# Patient Record
Sex: FEMALE | Race: WHITE | Employment: OTHER | URBAN - METROPOLITAN AREA
[De-identification: names, ages, dates, MRNs, and addresses within clinical notes are randomized per-mention and may not be internally consistent; named-entity substitution may affect disease eponyms.]

---

## 2018-01-30 ENCOUNTER — OFFICE VISIT (OUTPATIENT)
Dept: ORTHOPEDIC SURGERY | Age: 83
End: 2018-01-30

## 2018-01-30 VITALS — WEIGHT: 149.91 LBS | HEIGHT: 65 IN | BODY MASS INDEX: 24.98 KG/M2

## 2018-01-30 DIAGNOSIS — S42.291A OTHER CLOSED DISPLACED FRACTURE OF PROXIMAL END OF RIGHT HUMERUS, INITIAL ENCOUNTER: Primary | ICD-10-CM

## 2018-01-30 PROCEDURE — 1090F PRES/ABSN URINE INCON ASSESS: CPT | Performed by: PHYSICIAN ASSISTANT

## 2018-01-30 PROCEDURE — G8484 FLU IMMUNIZE NO ADMIN: HCPCS | Performed by: PHYSICIAN ASSISTANT

## 2018-01-30 PROCEDURE — G8427 DOCREV CUR MEDS BY ELIG CLIN: HCPCS | Performed by: PHYSICIAN ASSISTANT

## 2018-01-30 PROCEDURE — 1036F TOBACCO NON-USER: CPT | Performed by: PHYSICIAN ASSISTANT

## 2018-01-30 PROCEDURE — 99203 OFFICE O/P NEW LOW 30 MIN: CPT | Performed by: PHYSICIAN ASSISTANT

## 2018-01-30 PROCEDURE — 1123F ACP DISCUSS/DSCN MKR DOCD: CPT | Performed by: PHYSICIAN ASSISTANT

## 2018-01-30 PROCEDURE — 4040F PNEUMOC VAC/ADMIN/RCVD: CPT | Performed by: PHYSICIAN ASSISTANT

## 2018-01-30 PROCEDURE — G8420 CALC BMI NORM PARAMETERS: HCPCS | Performed by: PHYSICIAN ASSISTANT

## 2018-01-30 NOTE — PROGRESS NOTES
(FOSAMAX) 70 MG tablet, Take 70 mg by mouth every 7 days. , Disp: , Rfl:     lidocaine (LIDODERM) 5 %, Place 1 patch onto the skin every 12 hours. TO BACK DAILY. ON 12HRS OFF 12HRS.  , Disp: , Rfl:     atenolol (TENORMIN) 25 MG tablet, Take 25 mg by mouth daily. , Disp: , Rfl:     docusate sodium (COLACE) 100 MG capsule, Take 100 mg by mouth daily. , Disp: , Rfl:     mometasone (NASONEX) 50 MCG/ACT nasal spray, 1 spray by Nasal route daily. EACH NOSTRIL , Disp: , Rfl:     clopidogrel (PLAVIX) 75 MG tablet, Take 75 mg by mouth daily. , Disp: , Rfl:     Multiple Vitamins-Minerals (THEREMS M) TABS, Take 1 tablet by mouth daily. THEREMS-M 27-0.4MG TABLET  , Disp: , Rfl:     omeprazole (PRILOSEC) 20 MG capsule, Take 20 mg by mouth 2 times daily. , Disp: , Rfl:     Calcium-Vitamin D (CALTRATE 600 PLUS-VIT D PO), Take 1 tablet by mouth 2 times daily. , Disp: , Rfl:     lisinopril (PRINIVIL;ZESTRIL) 20 MG tablet, Take 20 mg by mouth 2 times daily. , Disp: , Rfl:     oxybutynin (DITROPAN) 5 MG tablet, Take 5 mg by mouth 2 times daily. , Disp: , Rfl:     metoclopramide (REGLAN) 5 MG tablet, Take 5 mg by mouth 3 times daily. , Disp: , Rfl:     ipratropium-albuterol (DUONEB) 0.5-2.5 (3) MG/3ML SOLN nebulizer solution, Inhale 1 vial into the lungs 4 times daily. , Disp: , Rfl:     polyvinyl alcohol (TEARGEN) 1.4 % ophthalmic solution, Place 2 drops into both eyes 4 times daily. , Disp: , Rfl:     hydrocodone-acetaminophen (VICODIN) 5-500 MG per tablet, Take 1 tablet by mouth nightly.  , Disp: , Rfl:     lorazepam (ATIVAN) 1 MG tablet, Take 1 mg by mouth nightly.  , Disp: , Rfl:     magnesium hydroxide (MILK OF MAGNESIA) 400 MG/5ML suspension, Take 45 mLs by mouth nightly.  , Disp: , Rfl:     clotrimazole-betamethasone (LOTRISONE) cream, Apply 1 applicator topically 3 times daily. Apply topically 2 times daily.  , Disp: , Rfl:     nystatin (MYCOSTATIN) cream, Apply 1 applicator topically 2 times daily. Apply topically 2 times daily. , Disp: , Rfl:     hydrocortisone (ANUSOL-HC) 2.5 % rectal cream, Place 1 each rectally 4 times daily. Place rectally 2 times daily. , Disp: , Rfl:   Allergies:  Sulfa antibiotics  Social History:    reports that she has never smoked. She has never used smokeless tobacco. She reports that she does not drink alcohol or use drugs. Family History:   History reviewed. No pertinent family history. REVIEW OF SYSTEMS:   For new problems, a full review of systems will be found scanned in the patient's chart. CONSTITUTIONAL: Denies unexplained weight loss, fevers, chills   NEUROLOGICAL: Positive for unsteady gait  SKIN: Denies skin changes, delayed healing, rash, itching       PHYSICAL EXAM:    Vitals: Height 5' 5\" (1.651 m), weight 149 lb 14.6 oz (68 kg). GENERAL EXAM:  · General Apparence: Patient is adequately groomed with no evidence of malnutrition. · Orientation: The patient is oriented to person  · Mood & Affect:The patient's mood and affect are appropriate       Right shoulder PHYSICAL EXAMINATION:  · Inspection:  Several skin tears of the bilateral hands and 1 involving the proximal aspect of the shoulder. No active drainage, these are healing nicely without any evidence of infection. There is also ecchymosis of the proximal humerus as expected    · Palpation:  She is tender through the proximal humerus, no significant tenderness of the elbow wrist or forearm      · Range of Motion: Able to dorsiflex the wrist today, range of motion of the hand is tolerated    · Strength: Decreased  strength secondary to pain    · Special Tests:  Radial pulse is palpable and 2+            · Skin:  There are no rashes, ulcerations or lesions. · Gait & station: The patient presents on a stretcher today      · Additional Examinations:        Left Upper Extremity: Examination of the left upper extremity does not show any tenderness, deformity or injury.   Range of motion is

## 2018-02-20 ENCOUNTER — OFFICE VISIT (OUTPATIENT)
Dept: ORTHOPEDIC SURGERY | Age: 83
End: 2018-02-20

## 2018-02-20 VITALS — BODY MASS INDEX: 24.98 KG/M2 | WEIGHT: 149.91 LBS | HEIGHT: 65 IN

## 2018-02-20 DIAGNOSIS — M89.8X2 PAIN OF RIGHT HUMERUS: Primary | ICD-10-CM

## 2018-02-20 DIAGNOSIS — S42.291A OTHER CLOSED DISPLACED FRACTURE OF PROXIMAL END OF RIGHT HUMERUS, INITIAL ENCOUNTER: ICD-10-CM

## 2018-02-20 PROCEDURE — 1123F ACP DISCUSS/DSCN MKR DOCD: CPT | Performed by: PHYSICIAN ASSISTANT

## 2018-02-20 PROCEDURE — 4040F PNEUMOC VAC/ADMIN/RCVD: CPT | Performed by: PHYSICIAN ASSISTANT

## 2018-02-20 PROCEDURE — 99213 OFFICE O/P EST LOW 20 MIN: CPT | Performed by: PHYSICIAN ASSISTANT

## 2018-02-20 PROCEDURE — G8484 FLU IMMUNIZE NO ADMIN: HCPCS | Performed by: PHYSICIAN ASSISTANT

## 2018-02-20 PROCEDURE — G8427 DOCREV CUR MEDS BY ELIG CLIN: HCPCS | Performed by: PHYSICIAN ASSISTANT

## 2018-02-20 PROCEDURE — 1036F TOBACCO NON-USER: CPT | Performed by: PHYSICIAN ASSISTANT

## 2018-02-20 PROCEDURE — 1090F PRES/ABSN URINE INCON ASSESS: CPT | Performed by: PHYSICIAN ASSISTANT

## 2018-02-20 PROCEDURE — G8420 CALC BMI NORM PARAMETERS: HCPCS | Performed by: PHYSICIAN ASSISTANT

## 2018-02-20 NOTE — PROGRESS NOTES
CHIEF COMPLAINT:    Chief Complaint   Patient presents with    Shoulder Pain     ck right proximal humerus fx       HISTORY OF PRESENT ILLNESS:                The patient is a 80 y.o. female she presents today for follow-up regarding her RIGHT proximal humerus fracture date of injury 1/27/2018. [facility] is [facility] at [facility]. Before this injury, she was able to ambulate with a walker. The patient and her family were not interested in any type of surgical intervention and conservative treatment was discussed. The patient presents today for repeat x-ray and evaluation.   Past Medical History:   Diagnosis Date    Blindness, legal     L EYE    Constipation     Cystocele     Diverticulosis     DJD (degenerative joint disease)     SEVERE    [facility]     Eye problem     R EYE 20/10    GERD (gastroesophageal reflux disease)     [place] (hard of hearing)     Hypertension     Insomnia     Osteoarthritis     R FOOT/ANKLE DEFORMITY    PVD (peripheral vascular disease) ([address]     Stenosis, spinal, lumbar     Unspecified cerebral artery occlusion with cerebral infarction           The pain assessment was noted & is as follows:  Pain Assessment  Location of Pain: Other (Comment) (humerus)  Location Modifiers: Right  Quality of Pain: Aching, Dull  Aggravating Factors: Bending, Stretching  Limiting Behavior: Some  Relieving Factors: Rest]      Work Status/Functionality:     Past Medical History: Medical history form was reviewed today & can be found in the media tab  Past Medical History:   Diagnosis Date    Blindness, legal     L EYE    Constipation     Cystocele     Diverticulosis     DJD (degenerative joint disease)     SEVERE    DVT (deep venous thrombosis) ([address]     Eye problem     R EYE 20/10    GERD (gastroesophageal reflux disease)     [place] (hard of hearing)     Hypertension     Insomnia     Osteoarthritis     R FOOT/ANKLE DEFORMITY    [facility]     Stenosis, spinal, lumbar     Unspecified cerebral artery occlusion with cerebral infarction       Past Surgical History:     Past Surgical History:   Procedure Laterality Date    BACK SURGERY      CHOLECYSTECTOMY      EYE SURGERY      CATARACTS    HYSTERECTOMY      KNEE SURGERY      L KNEE REPLACEMENT    RECTOCELE REPAIR      SPINAL CORD DECOMPRESSION      LUMBAR     Current Medications:     Current Outpatient Prescriptions:     acetaminophen (TYLENOL) 325 MG tablet, Take 650 mg by mouth.  , Disp: , Rfl:     phenol 1.4 % LIQD, Take 1 drop by mouth every 2 hours as needed. SPRAY AFFECTED AREA, LEAVE IN MOUTH FOR 15 SECONDS THEN SPIT. , Disp: , Rfl:     guaifenesin (ROBITUSSIN) 100 MG/5ML SOLN, Take 5 mLs by mouth every 4 hours as needed. , Disp: , Rfl:     aluminum & magnesium hydroxide-simethicone (MAALOX) 558-249-47 MG/5ML SUSP suspension, Take 30 mLs by mouth as needed. , Disp: , Rfl:     simethicone (MYLICON) 80 MG chewable tablet, Take 80 mg by mouth 4 times daily as needed. , Disp: , Rfl:     alendronate (FOSAMAX) 70 MG tablet, Take 70 mg by mouth every 7 days. , Disp: , Rfl:     lidocaine (LIDODERM) 5 %, Place 1 patch onto the skin every 12 hours. TO BACK DAILY. ON 12HRS OFF 12HRS.  , Disp: , Rfl:     atenolol (TENORMIN) 25 MG tablet, Take 25 mg by mouth daily. , Disp: , Rfl:     docusate sodium (COLACE) 100 MG capsule, Take 100 mg by mouth daily. , Disp: , Rfl:     mometasone (NASONEX) 50 MCG/ACT nasal spray, 1 spray by Nasal route daily. EACH NOSTRIL , Disp: , Rfl:     clopidogrel (PLAVIX) 75 MG tablet, Take 75 mg by mouth daily. , Disp: , Rfl:     Multiple Vitamins-Minerals (THEREMS M) TABS, Take 1 tablet by mouth daily. THEREMS-M 27-0.4MG TABLET  , Disp: , Rfl:     omeprazole (PRILOSEC) 20 MG capsule, Take 20 mg by mouth 2 times daily. , Disp: , Rfl:     Calcium-Vitamin D (CALTRATE 600 PLUS-VIT D PO), Take 1 tablet by mouth 2 times daily.   , Disp: , Rfl:     lisinopril (PRINIVIL;ZESTRIL) 20 MG tablet, Take 20 mg by mouth 2 times daily. , Disp: , Rfl:     oxybutynin (DITROPAN) 5 MG tablet, Take 5 mg by mouth 2 times daily. , Disp: , Rfl:     metoclopramide (REGLAN) 5 MG tablet, Take 5 mg by mouth 3 times daily. , Disp: , Rfl:     ipratropium-albuterol (DUONEB) 0.5-2.5 (3) MG/3ML SOLN nebulizer solution, Inhale 1 vial into the lungs 4 times daily. , Disp: , Rfl:     polyvinyl alcohol (TEARGEN) 1.4 % ophthalmic solution, Place 2 drops into both eyes 4 times daily. , Disp: , Rfl:     hydrocodone-acetaminophen (VICODIN) 5-500 MG per tablet, Take 1 tablet by mouth nightly.  , Disp: , Rfl:     lorazepam (ATIVAN) 1 MG tablet, Take 1 mg by mouth nightly.  , Disp: , Rfl:     magnesium hydroxide (MILK OF MAGNESIA) 400 MG/5ML suspension, Take 45 mLs by mouth nightly.  , Disp: , Rfl:     clotrimazole-betamethasone (LOTRISONE) cream, Apply 1 applicator topically 3 times daily. Apply topically 2 times daily. , Disp: , Rfl:     nystatin (MYCOSTATIN) cream, Apply 1 applicator topically 2 times daily. Apply topically 2 times daily. , Disp: , Rfl:     hydrocortisone (ANUSOL-HC) 2.5 % rectal cream, Place 1 each rectally 4 times daily. Place rectally 2 times daily. , Disp: , Rfl:   Allergies:  Sulfa antibiotics  Social History:    reports that she has never smoked. She has never used smokeless tobacco. She reports that she does not drink alcohol or use drugs. Family History:   History reviewed. No pertinent family history. REVIEW OF SYSTEMS:   For new problems, a full review of systems will be found scanned in the patient's chart. CONSTITUTIONAL: Denies unexplained weight loss, fevers, chills     SKIN: Denies skin changes, delayed healing, rash, itching       PHYSICAL EXAM:    Vitals: Height 5' 5\" (1.651 m), weight 149 lb 14.6 oz (68 kg). GENERAL EXAM:  · General Apparence: Patient is adequately groomed with no evidence of malnutrition. · Orientation:  The

## 2018-03-21 ENCOUNTER — OFFICE VISIT (OUTPATIENT)
Dept: ORTHOPEDIC SURGERY | Age: 83
End: 2018-03-21

## 2018-03-21 VITALS — WEIGHT: 149.91 LBS | HEIGHT: 65 IN | BODY MASS INDEX: 24.98 KG/M2

## 2018-03-21 DIAGNOSIS — S42.291A OTHER CLOSED DISPLACED FRACTURE OF PROXIMAL END OF RIGHT HUMERUS, INITIAL ENCOUNTER: ICD-10-CM

## 2018-03-21 DIAGNOSIS — M25.511 PAIN OF RIGHT SHOULDER REGION: Primary | ICD-10-CM

## 2018-03-21 PROCEDURE — 1090F PRES/ABSN URINE INCON ASSESS: CPT | Performed by: ORTHOPAEDIC SURGERY

## 2018-03-21 PROCEDURE — 1123F ACP DISCUSS/DSCN MKR DOCD: CPT | Performed by: ORTHOPAEDIC SURGERY

## 2018-03-21 PROCEDURE — 1036F TOBACCO NON-USER: CPT | Performed by: ORTHOPAEDIC SURGERY

## 2018-03-21 PROCEDURE — 99213 OFFICE O/P EST LOW 20 MIN: CPT | Performed by: ORTHOPAEDIC SURGERY

## 2018-03-21 PROCEDURE — G8420 CALC BMI NORM PARAMETERS: HCPCS | Performed by: ORTHOPAEDIC SURGERY

## 2018-03-21 PROCEDURE — 4040F PNEUMOC VAC/ADMIN/RCVD: CPT | Performed by: ORTHOPAEDIC SURGERY

## 2018-03-21 PROCEDURE — G8484 FLU IMMUNIZE NO ADMIN: HCPCS | Performed by: ORTHOPAEDIC SURGERY

## 2018-03-21 PROCEDURE — G8427 DOCREV CUR MEDS BY ELIG CLIN: HCPCS | Performed by: ORTHOPAEDIC SURGERY

## 2018-03-21 NOTE — PROGRESS NOTES
History:   Procedure Laterality Date    BACK SURGERY      CHOLECYSTECTOMY      EYE SURGERY      CATARACTS    HYSTERECTOMY      KNEE SURGERY      L KNEE REPLACEMENT    RECTOCELE REPAIR      SPINAL CORD DECOMPRESSION      LUMBAR     Current Medications:     Current Outpatient Prescriptions:     acetaminophen (TYLENOL) 325 MG tablet, Take 650 mg by mouth.  , Disp: , Rfl:     phenol 1.4 % LIQD, Take 1 drop by mouth every 2 hours as needed. SPRAY AFFECTED AREA, LEAVE IN MOUTH FOR 15 SECONDS THEN SPIT. , Disp: , Rfl:     guaifenesin (ROBITUSSIN) 100 MG/5ML SOLN, Take 5 mLs by mouth every 4 hours as needed. , Disp: , Rfl:     aluminum & magnesium hydroxide-simethicone (MAALOX) 280-769-51 MG/5ML SUSP suspension, Take 30 mLs by mouth as needed. , Disp: , Rfl:     simethicone (MYLICON) 80 MG chewable tablet, Take 80 mg by mouth 4 times daily as needed. , Disp: , Rfl:     alendronate (FOSAMAX) 70 MG tablet, Take 70 mg by mouth every 7 days. , Disp: , Rfl:     lidocaine (LIDODERM) 5 %, Place 1 patch onto the skin every 12 hours. TO BACK DAILY. ON 12HRS OFF 12HRS.  , Disp: , Rfl:     atenolol (TENORMIN) 25 MG tablet, Take 25 mg by mouth daily. , Disp: , Rfl:     docusate sodium (COLACE) 100 MG capsule, Take 100 mg by mouth daily. , Disp: , Rfl:     mometasone (NASONEX) 50 MCG/ACT nasal spray, 1 spray by Nasal route daily. EACH NOSTRIL , Disp: , Rfl:     clopidogrel (PLAVIX) 75 MG tablet, Take 75 mg by mouth daily. , Disp: , Rfl:     Multiple Vitamins-Minerals (THEREMS M) TABS, Take 1 tablet by mouth daily. THEREMS-M 27-0.4MG TABLET  , Disp: , Rfl:     omeprazole (PRILOSEC) 20 MG capsule, Take 20 mg by mouth 2 times daily. , Disp: , Rfl:     Calcium-Vitamin D (CALTRATE 600 PLUS-VIT D PO), Take 1 tablet by mouth 2 times daily. , Disp: , Rfl:     lisinopril (PRINIVIL;ZESTRIL) 20 MG tablet, Take 20 mg by mouth 2 times daily.   , Disp: , Rfl:     oxybutynin (DITROPAN) 5 MG tablet, Take 5 mg by mouth 2 times daily. , Disp: , Rfl:     metoclopramide (REGLAN) 5 MG tablet, Take 5 mg by mouth 3 times daily. , Disp: , Rfl:     ipratropium-albuterol (DUONEB) 0.5-2.5 (3) MG/3ML SOLN nebulizer solution, Inhale 1 vial into the lungs 4 times daily. , Disp: , Rfl:     polyvinyl alcohol (TEARGEN) 1.4 % ophthalmic solution, Place 2 drops into both eyes 4 times daily. , Disp: , Rfl:     hydrocodone-acetaminophen (VICODIN) 5-500 MG per tablet, Take 1 tablet by mouth nightly.  , Disp: , Rfl:     lorazepam (ATIVAN) 1 MG tablet, Take 1 mg by mouth nightly.  , Disp: , Rfl:     magnesium hydroxide (MILK OF MAGNESIA) 400 MG/5ML suspension, Take 45 mLs by mouth nightly.  , Disp: , Rfl:     clotrimazole-betamethasone (LOTRISONE) cream, Apply 1 applicator topically 3 times daily. Apply topically 2 times daily. , Disp: , Rfl:     nystatin (MYCOSTATIN) cream, Apply 1 applicator topically 2 times daily. Apply topically 2 times daily. , Disp: , Rfl:     hydrocortisone (ANUSOL-HC) 2.5 % rectal cream, Place 1 each rectally 4 times daily. Place rectally 2 times daily. , Disp: , Rfl:   Allergies:  Sulfa antibiotics  Social History:    reports that she has never smoked. She has never used smokeless tobacco. She reports that she does not drink alcohol or use drugs. Family History:   History reviewed. No pertinent family history. REVIEW OF SYSTEMS:   For new problems, a full review of systems will be found scanned in the patient's chart. CONSTITUTIONAL: Denies unexplained weight loss, fevers, chills   NEUROLOGICAL: Denies unsteady gait or progressive weakness  SKIN: Denies skin changes, delayed healing, rash, itching       PHYSICAL EXAM:    Vitals: Height 5' 5\" (1.651 m), weight 149 lb 14.6 oz (68 kg). GENERAL EXAM:  · General Apparence: Patient is adequately groomed with no evidence of malnutrition. · Orientation: The patient is oriented to time, place and person.    · Mood & Affect:The patient's mood and affect are

## 2018-09-04 ENCOUNTER — INITIAL CONSULT (OUTPATIENT)
Dept: SURGERY | Age: 83
End: 2018-09-04

## 2018-09-04 ENCOUNTER — PREP FOR PROCEDURE (OUTPATIENT)
Dept: SURGERY | Age: 83
End: 2018-09-04

## 2018-09-04 ENCOUNTER — TELEPHONE (OUTPATIENT)
Dept: SURGERY | Age: 83
End: 2018-09-04

## 2018-09-04 VITALS
HEIGHT: 65 IN | BODY MASS INDEX: 23.99 KG/M2 | DIASTOLIC BLOOD PRESSURE: 60 MMHG | WEIGHT: 144 LBS | SYSTOLIC BLOOD PRESSURE: 100 MMHG

## 2018-09-04 DIAGNOSIS — D48.5 NEOPLASM OF UNCERTAIN BEHAVIOR OF SKIN: Primary | ICD-10-CM

## 2018-09-04 PROBLEM — M81.0 AGE-RELATED OSTEOPOROSIS WITHOUT CURRENT PATHOLOGICAL FRACTURE: Status: ACTIVE | Noted: 2017-11-27

## 2018-09-04 PROBLEM — M15.9 OSTEOARTHRITIS OF MULTIPLE JOINTS: Status: ACTIVE | Noted: 2017-11-27

## 2018-09-04 PROBLEM — H35.3290 EXUDATIVE AGE-RELATED MACULAR DEGENERATION (HCC): Status: ACTIVE | Noted: 2017-11-27

## 2018-09-04 PROBLEM — I70.219 ATHEROSCLEROSIS OF NATIVE ARTERIES OF EXTREMITIES WITH INTERMITTENT CLAUDICATION, UNSPECIFIED EXTREMITY (HCC): Status: ACTIVE | Noted: 2017-11-27

## 2018-09-04 PROBLEM — H54.50 BLINDNESS OF LEFT EYE WITH LOW VISION IN CONTRALATERAL EYE: Status: ACTIVE | Noted: 2017-11-27

## 2018-09-04 PROBLEM — F41.1 GAD (GENERALIZED ANXIETY DISORDER): Status: ACTIVE | Noted: 2017-12-10

## 2018-09-04 PROBLEM — F33.1 MODERATE EPISODE OF RECURRENT MAJOR DEPRESSIVE DISORDER (HCC): Status: ACTIVE | Noted: 2017-11-27

## 2018-09-04 PROBLEM — H54.40 BLINDNESS OF LEFT EYE WITH LOW VISION IN CONTRALATERAL EYE: Status: ACTIVE | Noted: 2017-11-27

## 2018-09-04 PROBLEM — I10 ESSENTIAL HYPERTENSION: Status: ACTIVE | Noted: 2017-11-27

## 2018-09-04 PROCEDURE — 1101F PT FALLS ASSESS-DOCD LE1/YR: CPT | Performed by: SURGERY

## 2018-09-04 PROCEDURE — G8428 CUR MEDS NOT DOCUMENT: HCPCS | Performed by: SURGERY

## 2018-09-04 PROCEDURE — 1090F PRES/ABSN URINE INCON ASSESS: CPT | Performed by: SURGERY

## 2018-09-04 PROCEDURE — 99202 OFFICE O/P NEW SF 15 MIN: CPT | Performed by: SURGERY

## 2018-09-04 PROCEDURE — G8420 CALC BMI NORM PARAMETERS: HCPCS | Performed by: SURGERY

## 2018-09-04 PROCEDURE — 1036F TOBACCO NON-USER: CPT | Performed by: SURGERY

## 2018-09-04 PROCEDURE — 1123F ACP DISCUSS/DSCN MKR DOCD: CPT | Performed by: SURGERY

## 2018-09-04 PROCEDURE — 4040F PNEUMOC VAC/ADMIN/RCVD: CPT | Performed by: SURGERY

## 2018-09-04 RX ORDER — FUROSEMIDE 40 MG/1
40 TABLET ORAL 2 TIMES DAILY
COMMUNITY

## 2018-09-04 RX ORDER — SODIUM CHLORIDE 0.9 % (FLUSH) 0.9 %
10 SYRINGE (ML) INJECTION EVERY 12 HOURS SCHEDULED
Status: CANCELLED | OUTPATIENT
Start: 2018-09-04 | End: 2019-09-04

## 2018-09-04 RX ORDER — SODIUM CHLORIDE 0.9 % (FLUSH) 0.9 %
10 SYRINGE (ML) INJECTION PRN
Status: CANCELLED | OUTPATIENT
Start: 2018-09-04 | End: 2019-09-04

## 2018-09-04 RX ORDER — FERROUS SULFATE 325(65) MG
325 TABLET ORAL
COMMUNITY

## 2018-09-04 RX ORDER — DULOXETIN HYDROCHLORIDE 30 MG/1
30 CAPSULE, DELAYED RELEASE ORAL DAILY
COMMUNITY

## 2018-09-05 ENCOUNTER — TELEPHONE (OUTPATIENT)
Dept: SURGERY | Age: 83
End: 2018-09-05

## 2018-09-06 RX ORDER — POLYETHYLENE GLYCOL 3350 17 G/17G
17 POWDER, FOR SOLUTION ORAL DAILY
COMMUNITY

## 2018-09-06 RX ORDER — ACETAMINOPHEN 500 MG
1000 TABLET ORAL NIGHTLY
COMMUNITY

## 2018-09-06 RX ORDER — LANOLIN ALCOHOL/MO/W.PET/CERES
6 CREAM (GRAM) TOPICAL NIGHTLY PRN
COMMUNITY

## 2018-09-06 NOTE — PRE-PROCEDURE INSTRUCTIONS
.1.  Do not eat or drink anything after 12 midnight prior to surgery. This includes no water, chewing gum or mints. 2.  Take the following pills with a small sip of water on the morning of surgery 9/13/18. 3.  Aspirin, Ibuprofen, Advil, Naproxen, Vitamin E and other Anti-inflammatory products should be stopped for 5 days before surgery or as directed by your physician. 4.  Check with your doctor regarding stopping Plavix, Coumadin, Lovenox, Fragmin or other blood thinners. 5.  Do not smoke and do not drink alcoholic beverages 24 hours prior to surgery. This includes NA Beer. 6.  You may brush your teeth and gargle the morning of surgery. DO NOT SWALLOW WATER.  7.  You MUST make arrangements for a responsible adult to take you home after your surgery. You will not be allowed to leave alone or drive yourself home. It is strongly suggested someone stay with you the first 24 hours. Your surgery will be cancelled if you do not have a ride home. 8.  A parent/legal guardian must accompany a child scheduled for surgery and plan to stay at the hospital until the child is discharged. Please do not bring other children with you. 9.  Please wear simple, loose fitting clothing to the hospital.  Earlis Alu not bring valuables ( money, credit cards, checkbooks, etc.)  Do not wear any makeup (including no eye makeup) or nail polish on your fingers or toes. 10.  Do not wear any jewelry or piercing on the day of surgery. All body piercing jewelry must be removed. 11.  If you have dentures, they will be removed before going to the OR; we will provide you a container. If you wear contact lenses or glasses, they will be removed; please bring a case for them. 12.  Please see your family doctor/pediatrician for a history & physical and/or concerning medications. Bring any test results/reports from your physician's office the day of surgery.     13.  Remember to bring Blood Bank Bracelet to the hospital on the day of

## 2018-09-12 ENCOUNTER — ANESTHESIA EVENT (OUTPATIENT)
Dept: OPERATING ROOM | Age: 83
End: 2018-09-12
Payer: COMMERCIAL

## 2018-09-12 NOTE — ANESTHESIA PRE PROCEDURE
Department of Anesthesiology  Preprocedure Note       Name:  Beatrix Cushing   Age:  80 y.o.  :  1921                                          MRN:  [id number]         Date:  2018      Surgeon: Virginia Angela):  Belinda Paz MD    Procedure: Procedure(s):  EXCISION LESIONS UPPER ARM    Medications prior to admission:   Prior to Admission medications    Medication Sig Start Date End Date Taking? Authorizing Provider   acetaminophen (TYLENOL) 500 MG tablet Take 1,000 mg by mouth nightly   Yes Historical Provider, MD   melatonin 3 MG TABS tablet Take 6 mg by mouth nightly as needed   Yes Historical Provider, MD   Cholecalciferol (VITAMIN D3) 5000 units TABS Take 10,000 Units by mouth once a week   Yes Historical Provider, MD   sodium chloride (OCEAN, BABY AYR) 0.65 % nasal spray 1 spray by Nasal route as needed for Congestion   Yes Historical Provider, MD   polyethylene glycol (GLYCOLAX) powder Take 17 g by mouth daily   Yes Historical Provider, MD   Lidocaine 4 % GEL Apply topically as needed   Yes Historical Provider, MD   furosemide (LASIX) 40 MG tablet Take 40 mg by mouth 2 times daily    Historical Provider, MD   ferrous sulfate 325 (65 Fe) MG tablet Take 325 mg by mouth daily (with breakfast)    Historical Provider, MD   DULoxetine (CYMBALTA) 30 MG extended release capsule Take 30 mg by mouth daily    Historical Provider, MD   acetaminophen (TYLENOL) 325 MG tablet Take 650 mg by mouth. Historical Provider, MD   phenol 1.4 % LIQD Take 1 drop by mouth every 2 hours as needed. SPRAY AFFECTED AREA, LEAVE IN MOUTH FOR 15 SECONDS THEN SPIT. Historical Provider, MD   atenolol (TENORMIN) 25 MG tablet Take 25 mg by mouth daily. Historical Provider, MD   docusate sodium (COLACE) 100 MG capsule Take 100 mg by mouth daily. Historical Provider, MD   mometasone (NASONEX) 50 MCG/ACT nasal spray 1 spray by Nasal route daily.  EACH NOSTRIL     Historical Provider, MD   clopidogrel (PLAVIX) 75 MG and Nursing notes reviewed no history of anesthetic complications:   Airway: Mallampati: III     Neck ROM: full   Dental:          Pulmonary:Negative Pulmonary ROS and normal exam                               Cardiovascular:Negative CV ROS    (+) hypertension:,                   Neuro/Psych:   Negative Neuro/Psych ROS  (+) CVA:, psychiatric history:            GI/Hepatic/Renal: Neg GI/Hepatic/Renal ROS  (+) GERD: well controlled,      (-) hiatal hernia       Endo/Other: Negative Endo/Other ROS                    Abdominal:           Vascular:                                      Anesthesia Plan      general     ASA 3     (I discussed with the patient the risks and benefits of PIV, general anesthesia, IV Narcotics, PACU. All questions were answered the patient agrees with the plan.)  Induction: intravenous. discussed DNR status with POA family. Limited brief resuscitation options #2 on sheet signed and described all questions answered    Pre-Operative Diagnosis: LESION BILATERAL UPPER ARM    80 y.o.   BMI:  Body mass index is 23.96 kg/m².      Vitals:    09/06/18 1134   Weight: 144 lb (65.3 kg)   Height: 5' 5\" (1.651 m)       Allergies   Allergen Reactions    Sulfa Antibiotics      Does not remember what reaction was       Social History   Substance Use Topics    Smoking status: Never Smoker    Smokeless tobacco: Never Used    Alcohol use No       LABS:    CBC  Lab Results   Component Value Date/Time    WBC 10.3 01/27/2018 05:07 AM    HGB 12.3 01/27/2018 05:07 AM    HCT 36.7 01/27/2018 05:07 AM     01/27/2018 05:07 AM     RENAL  Lab Results   Component Value Date/Time     (L) 01/27/2018 05:07 AM    K 5.1 01/27/2018 05:07 AM    CL 90 (L) 01/27/2018 05:07 AM    CO2 24 01/27/2018 05:07 AM    BUN 19 01/27/2018 05:07 AM    CREATININE 0.9 01/27/2018 05:07 AM    GLUCOSE 116 (H) 01/27/2018 05:07 AM     COAGS  No results found for: PROTIME, INR, APTT    Charisse Gaxiola MD

## 2018-09-13 ENCOUNTER — HOSPITAL ENCOUNTER (OUTPATIENT)
Age: 83
Setting detail: OUTPATIENT SURGERY
Discharge: HOME | End: 2018-09-13
Attending: SURGERY
Payer: COMMERCIAL

## 2018-09-13 ENCOUNTER — ANESTHESIA (OUTPATIENT)
Dept: OPERATING ROOM | Age: 83
End: 2018-09-13
Payer: COMMERCIAL

## 2018-09-13 VITALS
RESPIRATION RATE: 18 BRPM | DIASTOLIC BLOOD PRESSURE: 78 MMHG | OXYGEN SATURATION: 96 % | SYSTOLIC BLOOD PRESSURE: 145 MMHG

## 2018-09-13 VITALS
BODY MASS INDEX: 23.99 KG/M2 | WEIGHT: 144 LBS | RESPIRATION RATE: 16 BRPM | HEART RATE: 62 BPM | TEMPERATURE: 97.8 F | SYSTOLIC BLOOD PRESSURE: 147 MMHG | DIASTOLIC BLOOD PRESSURE: 65 MMHG | HEIGHT: 65 IN | OXYGEN SATURATION: 100 %

## 2018-09-13 DIAGNOSIS — D48.5 NEOPLASM OF UNCERTAIN BEHAVIOR OF SKIN: Primary | ICD-10-CM

## 2018-09-13 PROCEDURE — 88305 TISSUE EXAM BY PATHOLOGIST: CPT

## 2018-09-13 PROCEDURE — 2580000003 HC RX 258: Performed by: SURGERY

## 2018-09-13 PROCEDURE — 3600000002 HC SURGERY LEVEL 2 BASE: Performed by: SURGERY

## 2018-09-13 PROCEDURE — 7100000011 HC PHASE II RECOVERY - ADDTL 15 MIN: Performed by: SURGERY

## 2018-09-13 PROCEDURE — 6370000000 HC RX 637 (ALT 250 FOR IP): Performed by: ANESTHESIOLOGY

## 2018-09-13 PROCEDURE — 2500000003 HC RX 250 WO HCPCS: Performed by: ANESTHESIOLOGY

## 2018-09-13 PROCEDURE — 3700000000 HC ANESTHESIA ATTENDED CARE: Performed by: SURGERY

## 2018-09-13 PROCEDURE — 2580000003 HC RX 258: Performed by: ANESTHESIOLOGY

## 2018-09-13 PROCEDURE — 7100000010 HC PHASE II RECOVERY - FIRST 15 MIN: Performed by: SURGERY

## 2018-09-13 PROCEDURE — 6360000002 HC RX W HCPCS: Performed by: NURSE ANESTHETIST, CERTIFIED REGISTERED

## 2018-09-13 PROCEDURE — 3700000001 HC ADD 15 MINUTES (ANESTHESIA): Performed by: SURGERY

## 2018-09-13 PROCEDURE — 2500000003 HC RX 250 WO HCPCS: Performed by: NURSE ANESTHETIST, CERTIFIED REGISTERED

## 2018-09-13 PROCEDURE — 12035 INTMD RPR S/A/T/EXT 12.6-20: CPT | Performed by: SURGERY

## 2018-09-13 PROCEDURE — 2500000003 HC RX 250 WO HCPCS: Performed by: SURGERY

## 2018-09-13 PROCEDURE — 6360000002 HC RX W HCPCS: Performed by: SURGERY

## 2018-09-13 PROCEDURE — 11606 EXC TR-EXT MAL+MARG >4 CM: CPT | Performed by: SURGERY

## 2018-09-13 PROCEDURE — 3600000012 HC SURGERY LEVEL 2 ADDTL 15MIN: Performed by: SURGERY

## 2018-09-13 PROCEDURE — 2709999900 HC NON-CHARGEABLE SUPPLY: Performed by: SURGERY

## 2018-09-13 RX ORDER — MORPHINE SULFATE 10 MG/ML
1 INJECTION, SOLUTION INTRAMUSCULAR; INTRAVENOUS EVERY 5 MIN PRN
Status: DISCONTINUED | OUTPATIENT
Start: 2018-09-13 | End: 2018-09-14 | Stop reason: HOSPADM

## 2018-09-13 RX ORDER — LABETALOL HYDROCHLORIDE 5 MG/ML
5 INJECTION, SOLUTION INTRAVENOUS EVERY 10 MIN PRN
Status: DISCONTINUED | OUTPATIENT
Start: 2018-09-13 | End: 2018-09-14 | Stop reason: HOSPADM

## 2018-09-13 RX ORDER — MEPERIDINE HYDROCHLORIDE 25 MG/ML
12.5 INJECTION INTRAMUSCULAR; INTRAVENOUS; SUBCUTANEOUS EVERY 5 MIN PRN
Status: DISCONTINUED | OUTPATIENT
Start: 2018-09-13 | End: 2018-09-14 | Stop reason: HOSPADM

## 2018-09-13 RX ORDER — SODIUM CHLORIDE 0.9 % (FLUSH) 0.9 %
10 SYRINGE (ML) INJECTION EVERY 12 HOURS SCHEDULED
Status: DISCONTINUED | OUTPATIENT
Start: 2018-09-13 | End: 2018-09-13 | Stop reason: SDUPTHER

## 2018-09-13 RX ORDER — HYDROMORPHONE HCL 110MG/55ML
0.5 PATIENT CONTROLLED ANALGESIA SYRINGE INTRAVENOUS EVERY 5 MIN PRN
Status: DISCONTINUED | OUTPATIENT
Start: 2018-09-13 | End: 2018-09-14 | Stop reason: HOSPADM

## 2018-09-13 RX ORDER — BUPIVACAINE HYDROCHLORIDE 5 MG/ML
INJECTION, SOLUTION PERINEURAL PRN
Status: DISCONTINUED | OUTPATIENT
Start: 2018-09-13 | End: 2018-09-14 | Stop reason: HOSPADM

## 2018-09-13 RX ORDER — SODIUM CHLORIDE 0.9 % (FLUSH) 0.9 %
10 SYRINGE (ML) INJECTION EVERY 12 HOURS SCHEDULED
Status: DISCONTINUED | OUTPATIENT
Start: 2018-09-13 | End: 2018-09-14 | Stop reason: HOSPADM

## 2018-09-13 RX ORDER — DIPHENHYDRAMINE HYDROCHLORIDE 50 MG/ML
12.5 INJECTION INTRAMUSCULAR; INTRAVENOUS
Status: ACTIVE | OUTPATIENT
Start: 2018-09-13 | End: 2018-09-13

## 2018-09-13 RX ORDER — LIDOCAINE HYDROCHLORIDE 10 MG/ML
INJECTION, SOLUTION EPIDURAL; INFILTRATION; INTRACAUDAL; PERINEURAL PRN
Status: DISCONTINUED | OUTPATIENT
Start: 2018-09-13 | End: 2018-09-14 | Stop reason: HOSPADM

## 2018-09-13 RX ORDER — MORPHINE SULFATE 10 MG/ML
2 INJECTION, SOLUTION INTRAMUSCULAR; INTRAVENOUS EVERY 5 MIN PRN
Status: DISCONTINUED | OUTPATIENT
Start: 2018-09-13 | End: 2018-09-14 | Stop reason: HOSPADM

## 2018-09-13 RX ORDER — PROMETHAZINE HYDROCHLORIDE 25 MG/ML
6.25 INJECTION, SOLUTION INTRAMUSCULAR; INTRAVENOUS
Status: DISCONTINUED | OUTPATIENT
Start: 2018-09-13 | End: 2018-09-14 | Stop reason: HOSPADM

## 2018-09-13 RX ORDER — SODIUM CHLORIDE 0.9 % (FLUSH) 0.9 %
10 SYRINGE (ML) INJECTION PRN
Status: DISCONTINUED | OUTPATIENT
Start: 2018-09-13 | End: 2018-09-13 | Stop reason: SDUPTHER

## 2018-09-13 RX ORDER — OXYCODONE HYDROCHLORIDE AND ACETAMINOPHEN 5; 325 MG/1; MG/1
1 TABLET ORAL PRN
Status: COMPLETED | OUTPATIENT
Start: 2018-09-13 | End: 2018-09-13

## 2018-09-13 RX ORDER — SODIUM CHLORIDE 0.9 % (FLUSH) 0.9 %
10 SYRINGE (ML) INJECTION PRN
Status: DISCONTINUED | OUTPATIENT
Start: 2018-09-13 | End: 2018-09-14 | Stop reason: HOSPADM

## 2018-09-13 RX ORDER — HYDRALAZINE HYDROCHLORIDE 20 MG/ML
5 INJECTION INTRAMUSCULAR; INTRAVENOUS EVERY 10 MIN PRN
Status: DISCONTINUED | OUTPATIENT
Start: 2018-09-13 | End: 2018-09-14 | Stop reason: HOSPADM

## 2018-09-13 RX ORDER — OXYCODONE HYDROCHLORIDE AND ACETAMINOPHEN 5; 325 MG/1; MG/1
2 TABLET ORAL PRN
Status: COMPLETED | OUTPATIENT
Start: 2018-09-13 | End: 2018-09-13

## 2018-09-13 RX ORDER — HYDROCODONE BITARTRATE AND ACETAMINOPHEN 5; 325 MG/1; MG/1
1 TABLET ORAL EVERY 4 HOURS PRN
Qty: 18 TABLET | Refills: 0 | Status: SHIPPED | OUTPATIENT
Start: 2018-09-13 | End: 2018-09-16

## 2018-09-13 RX ORDER — ONDANSETRON 2 MG/ML
4 INJECTION INTRAMUSCULAR; INTRAVENOUS PRN
Status: DISCONTINUED | OUTPATIENT
Start: 2018-09-13 | End: 2018-09-14 | Stop reason: HOSPADM

## 2018-09-13 RX ORDER — LIDOCAINE HYDROCHLORIDE 20 MG/ML
INJECTION, SOLUTION INFILTRATION; PERINEURAL PRN
Status: DISCONTINUED | OUTPATIENT
Start: 2018-09-13 | End: 2018-09-13 | Stop reason: SDUPTHER

## 2018-09-13 RX ORDER — CEPHALEXIN 500 MG/1
500 CAPSULE ORAL 3 TIMES DAILY
Qty: 21 CAPSULE | Refills: 0 | Status: SHIPPED | OUTPATIENT
Start: 2018-09-13 | End: 2018-09-20

## 2018-09-13 RX ORDER — SODIUM CHLORIDE, SODIUM LACTATE, POTASSIUM CHLORIDE, CALCIUM CHLORIDE 600; 310; 30; 20 MG/100ML; MG/100ML; MG/100ML; MG/100ML
INJECTION, SOLUTION INTRAVENOUS CONTINUOUS
Status: DISCONTINUED | OUTPATIENT
Start: 2018-09-13 | End: 2018-09-14 | Stop reason: HOSPADM

## 2018-09-13 RX ORDER — HYDROMORPHONE HCL 110MG/55ML
0.25 PATIENT CONTROLLED ANALGESIA SYRINGE INTRAVENOUS EVERY 5 MIN PRN
Status: DISCONTINUED | OUTPATIENT
Start: 2018-09-13 | End: 2018-09-14 | Stop reason: HOSPADM

## 2018-09-13 RX ORDER — LIDOCAINE HYDROCHLORIDE 10 MG/ML
0.3 INJECTION, SOLUTION EPIDURAL; INFILTRATION; INTRACAUDAL; PERINEURAL
Status: COMPLETED | OUTPATIENT
Start: 2018-09-13 | End: 2018-09-13

## 2018-09-13 RX ORDER — PROPOFOL 10 MG/ML
INJECTION, EMULSION INTRAVENOUS PRN
Status: DISCONTINUED | OUTPATIENT
Start: 2018-09-13 | End: 2018-09-13 | Stop reason: SDUPTHER

## 2018-09-13 RX ADMIN — VANCOMYCIN HYDROCHLORIDE 1000 MG: 1 INJECTION, POWDER, LYOPHILIZED, FOR SOLUTION INTRAVENOUS at 12:34

## 2018-09-13 RX ADMIN — LIDOCAINE HYDROCHLORIDE 0.3 ML: 10 INJECTION, SOLUTION EPIDURAL; INFILTRATION; INTRACAUDAL; PERINEURAL at 11:57

## 2018-09-13 RX ADMIN — OXYCODONE HYDROCHLORIDE AND ACETAMINOPHEN 1 TABLET: 5; 325 TABLET ORAL at 14:14

## 2018-09-13 RX ADMIN — PROPOFOL 200 MG: 10 INJECTION, EMULSION INTRAVENOUS at 13:08

## 2018-09-13 RX ADMIN — LIDOCAINE HYDROCHLORIDE 50 MG: 20 INJECTION, SOLUTION INFILTRATION; PERINEURAL at 13:08

## 2018-09-13 RX ADMIN — SODIUM CHLORIDE, POTASSIUM CHLORIDE, SODIUM LACTATE AND CALCIUM CHLORIDE: 600; 310; 30; 20 INJECTION, SOLUTION INTRAVENOUS at 11:57

## 2018-09-13 ASSESSMENT — PULMONARY FUNCTION TESTS
PIF_VALUE: 0
PIF_VALUE: 1

## 2018-09-13 ASSESSMENT — PAIN SCALES - GENERAL: PAINLEVEL_OUTOF10: 5

## 2018-09-13 ASSESSMENT — PAIN - FUNCTIONAL ASSESSMENT: PAIN_FUNCTIONAL_ASSESSMENT: 0-10

## 2018-09-13 NOTE — ANESTHESIA POSTPROCEDURE EVALUATION
01/27/2018 05:07 AM        CL                       90 (L)              01/27/2018 05:07 AM        CO2                      24                  01/27/2018 05:07 AM        BUN                      19                  01/27/2018 05:07 AM        CREATININE               0.9                 01/27/2018 05:07 AM        GLUCOSE                  116 (H)             01/27/2018 05:07 AM   COAGS  No results found for: PROTIME, INR, APTT    Intake & Output:  @28NCQA@    Nausea & Vomiting:  No    Level of Consciousness:  Awake    Pain Assessment:  Adequate analgesia    Anesthesia Complications:  No apparent anesthetic complications    SUMMARY      Vital signs stable  OK to discharge from Stage I post anesthesia care.   Care transferred from Anesthesiology department on discharge from perioperative area

## 2018-09-13 NOTE — PROGRESS NOTES
Pt is being discharged to nursing home. Discharge instructions and scripts gone over and given to pt/son. Pt/son denies any questions or concerns at this time. IV removed. Pt is in stable condition.

## 2018-09-13 NOTE — OP NOTE
[address]                  [address] [address]                                 OPERATIVE REPORT    PATIENT NAME: Emelyn Alcantar                    :        1921  MED REC NO:   [id number]                          ROOM:  ACCOUNT NO:   [de-identified]                           ADMIT DATE: 2018  PROVIDER:     Shiloh Garcia MD    DATE OF PROCEDURE:  2018    PREOPERATIVE DIAGNOSIS:  Skin cancer, bilateral upper arm. POSTOPERATIVE DIAGNOSIS:  Skin cancer, bilateral upper arm. PROCEDURE PERFORMED:  Excision of skin cancer, bilateral upper arms with  layered closure. SURGEON:  Shiloh Garcia MD    ANESTHESIA:  Local with MAC. INDICATIONS FOR OPERATION:  The patient is a 26-year-old woman, who  presented with skin cancers of both upper arms. She is brought in for  excision. OPERATIVE SUMMARY:  After preoperative evaluation, the patient was brought  in the operating suite and placed in a comfortable supine position on the  operating room table. Monitoring equipment was attached, and she was given  intravenous sedation per Anesthesia. Her bilateral upper arms were  sterilely prepped and draped and anesthetized with local anesthetic. The  8.5 x 8.5 cm right upper arm lesion was excised via a 4.5 x 10 cm ellipse  of skin. The 4.5 x 3.1 cm left upper arm lesion was excised via a 3.5 x 9  cm ellipse of skin. Bleeding was controlled in both wounds with  electrocautery. The wounds were closed with interrupted sutures of 3-0  Vicryl in a subcutaneous fascia. The skin was closed with 3-0 nylon  sutures. Dry dressings were applied. All sponge, needle and instrument  counts were correct at the end of case. The patient tolerated the  procedure well and was taken to recovery area in stable condition.         Alfredo Brown MD    D: 2018 14:19:22       T: 2018 14:21:21     JESUS [email or web address]  Job#: [id number]     Doc#: [id number]    CC: Derrick Penny MD

## 2018-09-25 ENCOUNTER — TELEPHONE (OUTPATIENT)
Dept: SURGERY | Age: 83
End: 2018-09-25

## 2018-09-25 NOTE — TELEPHONE ENCOUNTER
Julia Urena at [facility] called stating pt has sutures in and they have been in for 10 days. Would like to know if they should take these out or if they will be taken out at pt appt on Thursday.

## 2018-09-27 ENCOUNTER — OFFICE VISIT (OUTPATIENT)
Dept: SURGERY | Age: 83
End: 2018-09-27

## 2018-09-27 VITALS — SYSTOLIC BLOOD PRESSURE: 120 MMHG | DIASTOLIC BLOOD PRESSURE: 72 MMHG | BODY MASS INDEX: 23.96 KG/M2 | WEIGHT: 144 LBS

## 2018-09-27 DIAGNOSIS — Z98.890 POST-OPERATIVE STATE: Primary | ICD-10-CM

## 2018-09-27 PROCEDURE — 99024 POSTOP FOLLOW-UP VISIT: CPT | Performed by: SURGERY

## 2018-09-27 RX ORDER — CEPHALEXIN 250 MG/1
250 CAPSULE ORAL 3 TIMES DAILY
Qty: 21 CAPSULE | Refills: 0 | Status: SHIPPED | OUTPATIENT
Start: 2018-09-27 | End: 2018-10-04

## 2018-09-27 NOTE — PROGRESS NOTES
[facility] SURGERY      S:   Patient presents s/p excision of skin cancers from bilateral upper arms. She reports doing well. O:   Comfortable         Incision site left upper arm healing well. Right upper arm with some drainage. A:   S/P excision of skin cancers and bilateral upper arms    P:   Continue antibiotic ointment and dry dressing daily to right upper arm until the drainage stops. 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

## 2022-01-01 NOTE — H&P
I have reviewed the progress note serving as history and physical dated September/4/ 2018 and examined the patient and find no relevant changes. I have reviewed with the patient and/or family the risks, benefits, and alternatives to the procedure.
Spontaneous

## (undated) DEVICE — GLOVE ORANGE PI 7 1/2   MSG9075

## (undated) DEVICE — BANDAGE GZ W45INXL4 1 10YD FLUF RL 6 PLY DERMACEA

## (undated) DEVICE — SUTURE ETHLN SZ 3-0 L30IN NONABSORBABLE BLK FSL L30MM 3/8 1671H

## (undated) DEVICE — SOLUTION IV IRRIG 500ML 0.9% SODIUM CHL 2F7123

## (undated) DEVICE — 3M™ STERI-STRIP™ COMPOUND BENZOIN TINCTURE 40 BAGS/CARTON 4 CARTONS/CASE C1544: Brand: 3M™ STERI-STRIP™

## (undated) DEVICE — STANDARD HYPODERMIC NEEDLE,POLYPROPYLENE HUB: Brand: MONOJECT

## (undated) DEVICE — 3M™ TEGADERM™ TRANSPARENT FILM DRESSING FRAME STYLE, 1626W, 4 IN X 4-3/4 IN (10 CM X 12 CM), 50/CT 4CT/CASE: Brand: 3M™ TEGADERM™

## (undated) DEVICE — ELECTRODE PT RET AD L9FT HI MOIST COND ADH HYDRGEL CORDED

## (undated) DEVICE — MAJOR SET UP PK

## (undated) DEVICE — GAUZE,SPONGE,4"X4",8PLY,STRL,LF,10/TRAY: Brand: MEDLINE

## (undated) DEVICE — TIP SUCT DIA12FR W STYL CTRL VENT DISPOSABLE FRAZ

## (undated) DEVICE — TUBING, SUCTION, 3/16" X 10', STRAIGHT: Brand: MEDLINE

## (undated) DEVICE — 3M™ STERI-STRIP™ REINFORCED ADHESIVE SKIN CLOSURES, R1540, 1/8 IN X 3 IN (3 MM X 75 MM), 5 STRIPS/ENVELOPE: Brand: 3M™ STERI-STRIP™

## (undated) DEVICE — SUTURE VCRL SZ 4-0 L18IN ABSRB UD L19MM PS-2 3/8 CIR PRIM J496H

## (undated) DEVICE — GOWN SIRUS NONREIN XL W/TWL: Brand: MEDLINE INDUSTRIES, INC.

## (undated) DEVICE — SUTURE VCRL SZ 3-0 L18IN ABSRB UD L26MM SH 1/2 CIR J864D

## (undated) DEVICE — APPLICATOR PREP 26ML 0.7% IOD POVACRYLEX 74% ISO ALC ST

## (undated) DEVICE — PACK,UNIVERSAL,SPLIT,II,AURORA: Brand: MEDLINE

## (undated) DEVICE — YANKAUER,BULB TIP,W/O VENT,RIGID,STERILE: Brand: MEDLINE

## (undated) DEVICE — NEEDLE HYPO 25GA L1.5IN BLU POLYPR HUB S STL REG BVL STR

## (undated) DEVICE — SYRINGE MED 10ML LUERLOCK TIP W/O SFTY DISP

## (undated) DEVICE — CANNULA NSL 13FT TUBE AD ETCO2 DIV SAMP M